# Patient Record
Sex: MALE | Race: OTHER | HISPANIC OR LATINO
[De-identification: names, ages, dates, MRNs, and addresses within clinical notes are randomized per-mention and may not be internally consistent; named-entity substitution may affect disease eponyms.]

---

## 2019-11-04 PROBLEM — Z00.00 ENCOUNTER FOR PREVENTIVE HEALTH EXAMINATION: Status: ACTIVE | Noted: 2019-11-04

## 2019-11-05 ENCOUNTER — APPOINTMENT (OUTPATIENT)
Dept: FAMILY MEDICINE | Facility: CLINIC | Age: 24
End: 2019-11-05
Payer: COMMERCIAL

## 2019-11-05 VITALS
BODY MASS INDEX: 28.63 KG/M2 | WEIGHT: 200 LBS | OXYGEN SATURATION: 98 % | HEIGHT: 70 IN | SYSTOLIC BLOOD PRESSURE: 132 MMHG | RESPIRATION RATE: 18 BRPM | DIASTOLIC BLOOD PRESSURE: 80 MMHG | HEART RATE: 91 BPM

## 2019-11-05 DIAGNOSIS — Z78.9 OTHER SPECIFIED HEALTH STATUS: ICD-10-CM

## 2019-11-05 PROCEDURE — 99213 OFFICE O/P EST LOW 20 MIN: CPT | Mod: 25

## 2019-11-05 PROCEDURE — G0444 DEPRESSION SCREEN ANNUAL: CPT

## 2019-11-05 NOTE — HISTORY OF PRESENT ILLNESS
[FreeTextEntry8] : 23 year old male complaining of lower back pain on and off for about a month. Mostly notices the pain in the morning after getting out of bed. No radiation of pain or neurologic sxs.  Patient states no strenuous activities or injury. Sits only part of the day.  Mattress is 5-7 years old.

## 2019-11-05 NOTE — PLAN
[FreeTextEntry1] : Aleve BID for one to two weeks.\par Heat locally\par Stretching of the lower back/core strengthening.\par Consider replacing mattress.

## 2019-11-05 NOTE — PHYSICAL EXAM
[No Acute Distress] : no acute distress [No CVA Tenderness] : no CVA  tenderness [No Spinal Tenderness] : no spinal tenderness [No Focal Deficits] : no focal deficits [Deep Tendon Reflexes (DTR)] : deep tendon reflexes were 2+ and symmetric [Normal] : affect was normal and insight and judgment were intact [de-identified] : Overweight [de-identified] : Normal ROM at the waist.  [de-identified] : Normal LE strength

## 2019-11-05 NOTE — HEALTH RISK ASSESSMENT
[Yes] : Yes [Monthly or less (1 pt)] : Monthly or less (1 point) [1 or 2 (0 pts)] : 1 or 2 (0 points) [Never (0 pts)] : Never (0 points) [No] : In the past 12 months have you used drugs other than those required for medical reasons? No [No falls in past year] : Patient reported no falls in the past year [0] : 2) Feeling down, depressed, or hopeless: Not at all (0) [de-identified] : occasionally  [de-identified] : gym [de-identified] : patient states he has a healthy diet  [[id number]] : 0 [[id number]] : 0

## 2019-11-29 ENCOUNTER — RESULT REVIEW (OUTPATIENT)
Age: 24
End: 2019-11-29

## 2019-11-29 ENCOUNTER — APPOINTMENT (OUTPATIENT)
Dept: FAMILY MEDICINE | Facility: CLINIC | Age: 24
End: 2019-11-29
Payer: COMMERCIAL

## 2019-11-29 VITALS
HEART RATE: 80 BPM | HEIGHT: 70 IN | BODY MASS INDEX: 29.2 KG/M2 | DIASTOLIC BLOOD PRESSURE: 63 MMHG | OXYGEN SATURATION: 99 % | SYSTOLIC BLOOD PRESSURE: 122 MMHG | RESPIRATION RATE: 18 BRPM | WEIGHT: 204 LBS

## 2019-11-29 DIAGNOSIS — M54.5 LOW BACK PAIN: ICD-10-CM

## 2019-11-29 DIAGNOSIS — M62.830 MUSCLE SPASM OF BACK: ICD-10-CM

## 2019-11-29 PROCEDURE — 99213 OFFICE O/P EST LOW 20 MIN: CPT

## 2019-11-29 RX ORDER — METHOCARBAMOL 750 MG/1
750 TABLET, FILM COATED ORAL 3 TIMES DAILY
Qty: 45 | Refills: 0 | Status: ACTIVE | COMMUNITY
Start: 2019-11-29 | End: 1900-01-01

## 2019-11-29 RX ORDER — SODIUM SULFATE, POTASSIUM SULFATE, MAGNESIUM SULFATE 17.5; 3.13; 1.6 G/ML; G/ML; G/ML
17.5-3.13-1.6 SOLUTION, CONCENTRATE ORAL
Qty: 354 | Refills: 0 | Status: ACTIVE | COMMUNITY
Start: 2019-11-11

## 2019-11-29 RX ORDER — IBUPROFEN 800 MG/1
800 TABLET, FILM COATED ORAL 3 TIMES DAILY
Qty: 36 | Refills: 1 | Status: ACTIVE | COMMUNITY
Start: 2019-11-29 | End: 1900-01-01

## 2019-11-29 NOTE — HEALTH RISK ASSESSMENT
[No falls in past year] : Patient reported no falls in the past year [0] : 2) Feeling down, depressed, or hopeless: Not at all (0) [[id number]] : 0

## 2019-11-29 NOTE — PHYSICAL EXAM
[Normal] : no respiratory distress, lungs were clear to auscultation bilaterally and no accessory muscle use [de-identified] : decreased ROM, paraspinal muscle spasm

## 2019-11-29 NOTE — HISTORY OF PRESENT ILLNESS
[FreeTextEntry8] : Lower back pain, no radiation of the pain\par Seen previously by dr Shah, reports no improvement

## 2019-12-04 ENCOUNTER — RECORD ABSTRACTING (OUTPATIENT)
Age: 24
End: 2019-12-04

## 2019-12-04 DIAGNOSIS — Z83.49 FAMILY HISTORY OF OTHER ENDOCRINE, NUTRITIONAL AND METABOLIC DISEASES: ICD-10-CM

## 2019-12-04 DIAGNOSIS — E66.3 OVERWEIGHT: ICD-10-CM

## 2019-12-04 DIAGNOSIS — Z81.8 FAMILY HISTORY OF OTHER MENTAL AND BEHAVIORAL DISORDERS: ICD-10-CM

## 2020-01-18 ENCOUNTER — TRANSCRIPTION ENCOUNTER (OUTPATIENT)
Age: 25
End: 2020-01-18

## 2020-05-11 ENCOUNTER — TRANSCRIPTION ENCOUNTER (OUTPATIENT)
Age: 25
End: 2020-05-11

## 2022-05-31 ENCOUNTER — NON-APPOINTMENT (OUTPATIENT)
Age: 27
End: 2022-05-31

## 2023-02-01 NOTE — REVIEW OF SYSTEMS
[Back Pain] : back pain [Negative] : Psychiatric [de-identified] : no numbness, tingling or weakness in the LE High Dose Vitamin A Counseling: Side effects reviewed, pt to contact office should one occur.

## 2024-01-02 ENCOUNTER — RESULT REVIEW (OUTPATIENT)
Age: 29
End: 2024-01-02

## 2024-01-04 ENCOUNTER — TRANSCRIPTION ENCOUNTER (OUTPATIENT)
Age: 29
End: 2024-01-04

## 2024-01-08 ENCOUNTER — NON-APPOINTMENT (OUTPATIENT)
Age: 29
End: 2024-01-08

## 2024-01-23 ENCOUNTER — APPOINTMENT (OUTPATIENT)
Dept: SURGERY | Facility: CLINIC | Age: 29
End: 2024-01-23

## 2024-01-23 ENCOUNTER — NON-APPOINTMENT (OUTPATIENT)
Age: 29
End: 2024-01-23

## 2024-01-23 VITALS
HEIGHT: 70 IN | BODY MASS INDEX: 31.92 KG/M2 | RESPIRATION RATE: 18 BRPM | WEIGHT: 223 LBS | SYSTOLIC BLOOD PRESSURE: 142 MMHG | OXYGEN SATURATION: 98 % | DIASTOLIC BLOOD PRESSURE: 91 MMHG | HEART RATE: 83 BPM

## 2024-05-12 ENCOUNTER — TRANSCRIPTION ENCOUNTER (OUTPATIENT)
Age: 29
End: 2024-05-12